# Patient Record
Sex: MALE | Race: WHITE | NOT HISPANIC OR LATINO | URBAN - METROPOLITAN AREA
[De-identification: names, ages, dates, MRNs, and addresses within clinical notes are randomized per-mention and may not be internally consistent; named-entity substitution may affect disease eponyms.]

---

## 2021-06-03 ENCOUNTER — OFFICE VISIT CONVERTED (OUTPATIENT)
Dept: INTERNAL MEDICINE | Facility: CLINIC | Age: 61
End: 2021-06-03
Attending: INTERNAL MEDICINE

## 2021-06-05 NOTE — H&P
History and Physical      Patient Name: Alfredo Boston   Patient ID: [id number]   Sex: Male   YOB: 1960        Visit Date: Chelsey 3, 2021    Provider: Che Hunt MD   Location: [facility] [place] [place] [facility]   Location Address: [address] [place] [address], [address]  [place], [place]  [id number]   Location Phone: [phone]          Chief Complaint  · Est Care  · HTN      History Of Present Illness  Alfredo Boston is a 60 year old /White male who presents for evaluation and treatment of:      Previous PCP: N/A  Last blood work: 5/19/21 was at [facility] ER for HTN  Specialist: none  Cscope: has never had  Influenza vaccine: has not had, makes him sick when he gets it  Shingles vaccine: has never had  Pneumonia vaccine: has never had  COVID vaccine: UTD, J&J vaccine  Eye exam: about a year ago  Dental exam: was going to go on the 19th, but BP was too elevated  Tobacco use? Current, everyday, has been using for 40 years, 0.5 ppd    no other concerns    HTN: ER visit on 5/24/21. ER record reviewed. BP at that time was 159/101. He was started on amlodipine 5mg.  ER labs reviewed: CBC normal, CMP with normal kidney and liver function and normal electrolytes.     BP remains somewhat elevated today in clinic. Denies symptoms such as headaches, chest pain, dizziness, leg swelling.            Past Medical History  Disease Name Date Onset Notes   Hypertension --  --    Ringing in ear --  --          Allergy List  Allergen Name Date Reaction Notes   NO KNOWN DRUG ALLERGIES --  --  --          Family Medical History  Disease Name Relative/Age Notes   Heart Attack (MI)  --          Social History  Finding Status Start/Stop Quantity Notes   Alcohol --  --/-- --  --    Tobacco Current every day 18/-- 0.5 ppd has been smoking since 20 years old         Vitals  Date Time BP Position Site L\R Cuff Size HR RR TEMP (F) WT  HT  BMI kg/m2 BSA m2 O2 Sat FR L/min FiO2 HC       06/03/2021 01:49 /80 Sitting    89 - R   98.7 227lbs 6oz 6'   30.84 2.29 94 %            Physical Examination  · Constitutional  o Appearance  o : no acute distress, well-nourished  · Head and Face  o Head  o :   § Inspection  § : atraumatic, normocephalic  · Eyes  o Eyes  o : extraocular movements intact, no scleral icterus, no conjunctival injection  · Ears, Nose, Mouth and Throat  o Ears  o :   § External Ears  § : normal  o Nose  o :   § Intranasal Exam  § : nares patent  o Oral Cavity  o :   § Oral Mucosa  § : moist mucous membranes  · Respiratory  o Respiratory Effort  o : breathing comfortably, symmetric chest rise  o Auscultation of Lungs  o : clear to asculatation bilaterally, no wheezes, rales, or rhonchii  · Cardiovascular  o Heart  o :   § Auscultation of Heart  § : regular rate and rhythm, no murmurs, rubs, or gallops  o Peripheral Vascular System  o :   § Extremities  § : no edema  · Neurologic  o Mental Status Examination  o :   § Orientation  § : grossly oriented to person, place and time  o Gait and Station  o :   § Gait Screening  § : normal gait  · Psychiatric  o General  o : normal mood and affect              Assessment  · Screening for depression     V79.0/Z13.89  · Hypertension     401.9/I10  ER documentation and labs reviewed  remains slightly elevated today  will increase amlodipine to 10mg daily  Follow up in 1 month as he will be going out of town for the next few weeks  Call clinic with any concerns  · Establishing care with new doctor, encounter for     V65.8/Z76.89      Plan  · Orders  o ACO-18: Negative screen for clinical depression using a standardized tool () - V79.0/Z13.89 - 06/03/2021  o ACO-39: Current medications updated and reviewed (, 1159F) - - 06/03/2021  · Medications  o amlodipine 10 mg oral tablet   SIG: take 1 tablet (10 mg) by oral route once daily   DISP: (90) Tablet with 0 refills  Prescribed on 06/03/2021     o amlodipine 5 mg oral tablet   SIG: take 1 tablet (5 mg) by oral route once daily    DISP: (0) Tablet with 0 refills  Discontinued on 06/03/2021     o Medications have been Reconciled  o Transition of Care or Provider Policy  · Instructions  o Depression Screen completed and scanned into the EMR under the designated folder within the patient's documents.  o Today's PHQ-9 result is 5  o Take all medications as prescribed/directed.  o Patient was educated/instructed on their diagnosis, treatment and medications prior to discharge from the clinic today.  o Call the office with any concerns or questions.  · Disposition  o Follow up in 1 month  o Prescriptions sent electronically            Electronically Signed by: Che Hunt MD -Author on Chelsey 3, 2021 04:16:50 PM

## 2021-06-10 RX ORDER — AMLODIPINE BESYLATE 10 MG/1
10 TABLET ORAL DAILY
Qty: 30 TABLET | Refills: 0 | Status: SHIPPED | OUTPATIENT
Start: 2021-06-10 | End: 2021-08-30 | Stop reason: SDUPTHER

## 2021-06-10 RX ORDER — AMLODIPINE BESYLATE 10 MG/1
10 TABLET ORAL DAILY
COMMUNITY
Start: 2021-06-04 | End: 2021-06-10 | Stop reason: SDUPTHER

## 2021-07-08 ENCOUNTER — OFFICE VISIT (OUTPATIENT)
Dept: INTERNAL MEDICINE | Facility: CLINIC | Age: 61
End: 2021-07-08

## 2021-07-08 VITALS
TEMPERATURE: 97.4 F | HEIGHT: 72 IN | BODY MASS INDEX: 31.76 KG/M2 | SYSTOLIC BLOOD PRESSURE: 126 MMHG | WEIGHT: 234.5 LBS | OXYGEN SATURATION: 94 % | HEART RATE: 79 BPM | DIASTOLIC BLOOD PRESSURE: 82 MMHG

## 2021-07-08 DIAGNOSIS — I10 ESSENTIAL HYPERTENSION: Primary | ICD-10-CM

## 2021-07-08 PROCEDURE — 99213 OFFICE O/P EST LOW 20 MIN: CPT | Performed by: INTERNAL MEDICINE

## 2021-07-08 NOTE — ASSESSMENT & PLAN NOTE
Well controlled today  Tolerating medication well  Continue current management  No refill needed today, patient will call when refill needed

## 2021-07-08 NOTE — PATIENT INSTRUCTIONS
Hypertension, Adult  Hypertension is another name for high blood pressure. High blood pressure forces your heart to work harder to pump blood. This can cause problems over time.  There are two numbers in a blood pressure reading. There is a top number (systolic) over a bottom number (diastolic). It is best to have a blood pressure that is below 120/80. Healthy choices can help lower your blood pressure, or you may need medicine to help lower it.  What are the causes?  The cause of this condition is not known. Some conditions may be related to high blood pressure.  What increases the risk?  · Smoking.  · Having type 2 diabetes mellitus, high cholesterol, or both.  · Not getting enough exercise or physical activity.  · Being overweight.  · Having too much fat, sugar, calories, or salt (sodium) in your diet.  · Drinking too much alcohol.  · Having long-term (chronic) kidney disease.  · Having a family history of high blood pressure.  · Age. Risk increases with age.  · Race. You may be at higher risk if you are .  · Gender. Men are at higher risk than women before age 45. After age 65, women are at higher risk than men.  · Having obstructive sleep apnea.  · Stress.  What are the signs or symptoms?  · High blood pressure may not cause symptoms. Very high blood pressure (hypertensive crisis) may cause:  ? Headache.  ? Feelings of worry or nervousness (anxiety).  ? Shortness of breath.  ? Nosebleed.  ? A feeling of being sick to your stomach (nausea).  ? Throwing up (vomiting).  ? Changes in how you see.  ? Very bad chest pain.  ? Seizures.  How is this treated?  · This condition is treated by making healthy lifestyle changes, such as:  ? Eating healthy foods.  ? Exercising more.  ? Drinking less alcohol.  · Your health care provider may prescribe medicine if lifestyle changes are not enough to get your blood pressure under control, and if:  ? Your top number is above 130.  ? Your bottom number is above  80.  · Your personal target blood pressure may vary.  Follow these instructions at home:  Eating and drinking    · If told, follow the DASH eating plan. To follow this plan:  ? Fill one half of your plate at each meal with fruits and vegetables.  ? Fill one fourth of your plate at each meal with whole grains. Whole grains include whole-wheat pasta, brown rice, and whole-grain bread.  ? Eat or drink low-fat dairy products, such as skim milk or low-fat yogurt.  ? Fill one fourth of your plate at each meal with low-fat (lean) proteins. Low-fat proteins include fish, chicken without skin, eggs, beans, and tofu.  ? Avoid fatty meat, cured and processed meat, or chicken with skin.  ? Avoid pre-made or processed food.  · Eat less than 1,500 mg of salt each day.  · Do not drink alcohol if:  ? Your doctor tells you not to drink.  ? You are pregnant, may be pregnant, or are planning to become pregnant.  · If you drink alcohol:  ? Limit how much you use to:  § 0-1 drink a day for women.  § 0-2 drinks a day for men.  ? Be aware of how much alcohol is in your drink. In the [place] one drink equals one 12 oz bottle of beer (355 mL), one 5 oz glass of wine (148 mL), or one 1½ oz glass of hard liquor (44 mL).  Lifestyle    · Work with your doctor to stay at a healthy weight or to lose weight. Ask your doctor what the best weight is for you.  · Get at least 30 minutes of exercise most days of the week. This may include walking, swimming, or biking.  · Get at least 30 minutes of exercise that strengthens your muscles (resistance exercise) at least 3 days a week. This may include lifting weights or doing Pilates.  · Do not use any products that contain nicotine or tobacco, such as cigarettes, e-cigarettes, and chewing tobacco. If you need help quitting, ask your doctor.  · Check your blood pressure at home as told by your doctor.  · Keep all follow-up visits as told by your doctor. This is important.  Medicines  · Take over-the-counter  and prescription medicines only as told by your doctor. Follow directions carefully.  · Do not skip doses of blood pressure medicine. The medicine does not work as well if you skip doses. Skipping doses also puts you at risk for problems.  · Ask your doctor about side effects or reactions to medicines that you should watch for.  Contact a doctor if you:  · Think you are having a reaction to the medicine you are taking.  · Have headaches that keep coming back (recurring).  · Feel dizzy.  · Have swelling in your ankles.  · Have trouble with your vision.  Get help right away if you:  · Get a very bad headache.  · Start to feel mixed up (confused).  · Feel weak or numb.  · Feel faint.  · Have very bad pain in your:  ? Chest.  ? Belly (abdomen).  · Throw up more than once.  · Have trouble breathing.  Summary  · Hypertension is another name for high blood pressure.  · High blood pressure forces your heart to work harder to pump blood.  · For most people, a normal blood pressure is less than 120/80.  · Making healthy choices can help lower blood pressure. If your blood pressure does not get lower with healthy choices, you may need to take medicine.  This information is not intended to replace advice given to you by your health care provider. Make sure you discuss any questions you have with your health care provider.  Document Revised: 08/28/2019 Document Reviewed: 08/28/2019  [employer] Patient Education © 2021 [employer]

## 2021-07-08 NOTE — PROGRESS NOTES
"Chief Complaint  Follow-up and Hypertension    Subjective          Alfredo Boston presents to [facility]  Presenting for follow-up of high blood pressure  Blood pressure is well controlled today.  Patient denies any symptoms such as headache, dizziness, lower extremity swelling, palpitations.  States that he is tolerating the medication well.  No other concerns today      Objective   Vital Signs:   /82   Pulse 79   Temp 97.4 °F (36.3 °C) (Temporal)   Ht 182.9 cm (72\")   Wt 106 kg (234 lb 8 oz)   SpO2 94%   BMI 31.80 kg/m²     Physical Exam  Vitals reviewed.   Constitutional:       Appearance: Normal appearance. He is well-developed.   HENT:      Head: Normocephalic and atraumatic.   Eyes:      Conjunctiva/sclera: Conjunctivae normal.      Pupils: Pupils are equal, round, and reactive to light.   Neck:      Thyroid: No thyroid mass, thyromegaly or thyroid tenderness.   Cardiovascular:      Rate and Rhythm: Normal rate and regular rhythm.      Heart sounds: No murmur heard.   No friction rub. No gallop.    Pulmonary:      Effort: Pulmonary effort is normal.      Breath sounds: Normal breath sounds. No wheezing or rhonchi.   Lymphadenopathy:      Cervical: No cervical adenopathy.   Skin:     General: Skin is warm and dry.   Neurological:      Mental Status: He is alert and oriented to person, place, and time.   Psychiatric:         Mood and Affect: Affect normal.        Result Review :   The following data was reviewed by: Che Hunt MD on 07/08/2021:  Common labs    Common Labsle 5/19/21 5/19/21    1632 1632   Glucose  112 (A)   BUN  12   Creatinine  1.18   Sodium  140   Potassium  4.1   Chloride  103   Calcium  8.9   Albumin  4.1   Total Bilirubin  0.73   Alkaline Phosphatase  107   AST (SGOT)  21   ALT (SGPT)  20   WBC 11.19 (A)    Hemoglobin 15.3    Hematocrit 45.8    Platelets 272    (A) Abnormal value               Procedures    "   Assessment and Plan    Diagnoses and all orders for this visit:    1. Essential hypertension (Primary)  Assessment & Plan:  Well controlled today  Tolerating medication well  Continue current management  No refill needed today, patient will call when refill needed        Follow Up   Return in about 6 months (around 1/8/2022) for Recheck.  Patient was given instructions and counseling regarding his condition or for health maintenance advice. Please see specific information pulled into the AVS if appropriate.

## 2021-07-15 VITALS
HEIGHT: 72 IN | TEMPERATURE: 98.7 F | OXYGEN SATURATION: 94 % | SYSTOLIC BLOOD PRESSURE: 154 MMHG | WEIGHT: 227.37 LBS | HEART RATE: 89 BPM | BODY MASS INDEX: 30.8 KG/M2 | DIASTOLIC BLOOD PRESSURE: 80 MMHG

## 2021-09-01 RX ORDER — AMLODIPINE BESYLATE 10 MG/1
10 TABLET ORAL DAILY
Qty: 90 TABLET | Refills: 1 | Status: SHIPPED | OUTPATIENT
Start: 2021-09-01 | End: 2022-03-03 | Stop reason: SDUPTHER

## 2022-03-03 ENCOUNTER — TELEPHONE (OUTPATIENT)
Dept: INTERNAL MEDICINE | Facility: CLINIC | Age: 62
End: 2022-03-03

## 2022-03-03 RX ORDER — AMLODIPINE BESYLATE 10 MG/1
10 TABLET ORAL DAILY
Qty: 90 TABLET | Refills: 1 | Status: SHIPPED | OUTPATIENT
Start: 2022-03-03 | End: 2022-08-17 | Stop reason: SDUPTHER

## 2022-03-03 NOTE — TELEPHONE ENCOUNTER
Caller: Alfredo Boston    Relationship: Self    Best call back number:[phone]     Requested Prescriptions:   Requested Prescriptions     Pending Prescriptions Disp Refills   • amLODIPine (NORVASC) 10 MG tablet 90 tablet 1     Sig: Take 1 tablet by mouth Daily.        Pharmacy where request should be sent: [address] [facility] - [phone]  - [phone] FX     Additional details provided by patient: PATIENT HAS A WEEKS WORTH OF MEDICATION. REQUEST 90 DAY SUPPLY  Does the patient have less than a 3 day supply:  [] Yes  [x] No    Kalpana Eason Rep   03/03/22 10:28 EST

## 2022-08-17 NOTE — TELEPHONE ENCOUNTER
Caller: Alfredo Boston    Relationship: Self    Best call back number: [phone]    Requested Prescriptions:   Requested Prescriptions     Pending Prescriptions Disp Refills   • amLODIPine (NORVASC) 10 MG tablet 90 tablet 1     Sig: Take 1 tablet by mouth Daily.        [address] [facility] - [phone]  - [phone] FX     Additional details provided by patient:     Does the patient have less than a 3 day supply:  [] Yes  [x] No    Kalpana Alarcon Rep   08/17/22 16:08 EDT

## 2022-08-19 ENCOUNTER — TELEPHONE (OUTPATIENT)
Dept: INTERNAL MEDICINE | Facility: CLINIC | Age: 62
End: 2022-08-19

## 2022-08-19 RX ORDER — AMLODIPINE BESYLATE 10 MG/1
10 TABLET ORAL DAILY
Qty: 30 TABLET | Refills: 1 | Status: SHIPPED | OUTPATIENT
Start: 2022-08-19

## 2022-08-19 NOTE — TELEPHONE ENCOUNTER
Called patient. No answer; left VM for patient to give the office a call back on behalf of his medication that he is requesting. Patient needs to schedule an appointment within the next month or two to see his PCP before any further refills can be given.

## 2022-08-19 NOTE — TELEPHONE ENCOUNTER
[facility] WAS UNABLE TO WARM TRANSFER   Caller: Alfredo Boston    Relationship: Self    Best call back number: [phone]      What is the best time to reach you: Anytime     Who are you requesting to speak with (clinical staff, provider,  specific staff member): Clinical         What was the call regarding: [facility] was unable to warm transfer , patient decline appointment, states he is [place] at the moment, and returning call to the office.     Do you require a callback: Yes

## 2022-08-19 NOTE — TELEPHONE ENCOUNTER
Pt not seen within past 1 yr (last seen July 2021) and last labs in May 2021.   30d supply with 1 refill sent in.   Please advise pt to call and schedule his annual physical with pcp within the next 1-2 mos or we will no longer be able to fill his meds. Thank You